# Patient Record
Sex: MALE | URBAN - METROPOLITAN AREA
[De-identification: names, ages, dates, MRNs, and addresses within clinical notes are randomized per-mention and may not be internally consistent; named-entity substitution may affect disease eponyms.]

---

## 2024-03-18 ENCOUNTER — LAB (OUTPATIENT)
Dept: URBAN - METROPOLITAN AREA CLINIC 109 | Facility: CLINIC | Age: 79
End: 2024-03-18

## 2024-03-18 ENCOUNTER — OV NP (OUTPATIENT)
Dept: URBAN - METROPOLITAN AREA CLINIC 109 | Facility: CLINIC | Age: 79
End: 2024-03-18
Payer: MEDICARE

## 2024-03-18 VITALS
TEMPERATURE: 97.2 F | DIASTOLIC BLOOD PRESSURE: 87 MMHG | SYSTOLIC BLOOD PRESSURE: 149 MMHG | HEIGHT: 63 IN | BODY MASS INDEX: 22.5 KG/M2 | HEART RATE: 74 BPM | WEIGHT: 127 LBS

## 2024-03-18 DIAGNOSIS — K62.5 RECTAL BLEEDING: ICD-10-CM

## 2024-03-18 DIAGNOSIS — K59.09 CHRONIC CONSTIPATION: ICD-10-CM

## 2024-03-18 DIAGNOSIS — R63.4 UNINTENTIONAL WEIGHT LOSS: ICD-10-CM

## 2024-03-18 PROCEDURE — 99204 OFFICE O/P NEW MOD 45 MIN: CPT

## 2024-03-18 RX ORDER — POLYETHYLENE GLYCOL 3350, SODIUM SULFATE ANHYDROUS, SODIUM BICARBONATE, SODIUM CHLORIDE, POTASSIUM CHLORIDE 236; 22.74; 6.74; 5.86; 2.97 G/4L; G/4L; G/4L; G/4L; G/4L
4000 ML POWDER, FOR SOLUTION ORAL ONCE
Qty: 1 | Refills: 0 | OUTPATIENT
Start: 2024-03-18 | End: 2024-03-19

## 2024-03-18 NOTE — HPI-TODAY'S VISIT:
Patient presents today for evaluation of rectal bleeding. Reports BRBPR on toilet paper for past 3 months. No blood in stool. Denies history of similar symptoms. Endorses hemorrhoids. No rectal pain/itching or painful BMs. Has BM daily, but if harder consistency than usual takes miralax or senna. No recent change in BMs or diarrhea. Reports unintentional weight loss of 27 lbs over last two years. Denies UGI sxs or abd pain. No NSAIDs or blood thinners. Last colonoscopy ~10 yrs ago, unsure where. Denies hx polyps. No FHX colon cancer.

## 2024-04-15 ENCOUNTER — COLON (OUTPATIENT)
Dept: URBAN - METROPOLITAN AREA SURGERY CENTER 23 | Facility: SURGERY CENTER | Age: 79
End: 2024-04-15

## 2024-05-07 ENCOUNTER — OUT OF OFFICE VISIT (OUTPATIENT)
Dept: URBAN - METROPOLITAN AREA SURGERY CENTER 23 | Facility: SURGERY CENTER | Age: 79
End: 2024-05-07
Payer: MEDICARE

## 2024-05-07 ENCOUNTER — TELEPHONE ENCOUNTER (OUTPATIENT)
Dept: URBAN - METROPOLITAN AREA CLINIC 118 | Facility: CLINIC | Age: 79
End: 2024-05-07

## 2024-05-07 ENCOUNTER — CLAIMS CREATED FROM THE CLAIM WINDOW (OUTPATIENT)
Dept: URBAN - METROPOLITAN AREA CLINIC 4 | Facility: CLINIC | Age: 79
End: 2024-05-07
Payer: MEDICARE

## 2024-05-07 DIAGNOSIS — K57.30 ACQUIRED DIVERTICULOSIS OF COLON: ICD-10-CM

## 2024-05-07 DIAGNOSIS — K62.5 ANAL BLEEDING: ICD-10-CM

## 2024-05-07 DIAGNOSIS — D12.2 BENIGN NEOPLASM OF ASCENDING COLON: ICD-10-CM

## 2024-05-07 DIAGNOSIS — D12.2 ADENOMA OF ASCENDING COLON: ICD-10-CM

## 2024-05-07 DIAGNOSIS — K64.2 THIRD DEGREE HEMORRHOIDS: ICD-10-CM

## 2024-05-07 DIAGNOSIS — K62.3 COMPLETE RECTAL PROLAPSE: ICD-10-CM

## 2024-05-07 DIAGNOSIS — K64.8 EXTERNAL HEMORRHOIDS: ICD-10-CM

## 2024-05-07 DIAGNOSIS — I10 ACCELERATED ESSENTIAL HYPERTENSION: ICD-10-CM

## 2024-05-07 PROCEDURE — 45385 COLONOSCOPY W/LESION REMOVAL: CPT | Performed by: INTERNAL MEDICINE

## 2024-05-07 PROCEDURE — G8907 PT DOC NO EVENTS ON DISCHARG: HCPCS | Performed by: INTERNAL MEDICINE

## 2024-05-07 PROCEDURE — 88305 TISSUE EXAM BY PATHOLOGIST: CPT | Performed by: STUDENT IN AN ORGANIZED HEALTH CARE EDUCATION/TRAINING PROGRAM

## 2024-05-07 PROCEDURE — 46221 LIGATION OF HEMORRHOID(S): CPT | Performed by: INTERNAL MEDICINE

## 2024-05-07 PROCEDURE — 00811 ANES LWR INTST NDSC NOS: CPT | Performed by: NURSE ANESTHETIST, CERTIFIED REGISTERED

## 2024-05-20 ENCOUNTER — OFFICE VISIT (OUTPATIENT)
Dept: URBAN - METROPOLITAN AREA CLINIC 109 | Facility: CLINIC | Age: 79
End: 2024-05-20
Payer: MEDICARE

## 2024-05-20 ENCOUNTER — TELEPHONE ENCOUNTER (OUTPATIENT)
Dept: URBAN - METROPOLITAN AREA CLINIC 109 | Facility: CLINIC | Age: 79
End: 2024-05-20

## 2024-05-20 ENCOUNTER — DASHBOARD ENCOUNTERS (OUTPATIENT)
Age: 79
End: 2024-05-20

## 2024-05-20 VITALS
WEIGHT: 130.5 LBS | HEART RATE: 71 BPM | DIASTOLIC BLOOD PRESSURE: 98 MMHG | TEMPERATURE: 97.3 F | BODY MASS INDEX: 23.12 KG/M2 | SYSTOLIC BLOOD PRESSURE: 167 MMHG | HEIGHT: 63 IN

## 2024-05-20 DIAGNOSIS — K62.5 RECTAL BLEEDING: ICD-10-CM

## 2024-05-20 DIAGNOSIS — K59.09 CHRONIC CONSTIPATION: ICD-10-CM

## 2024-05-20 PROCEDURE — 99213 OFFICE O/P EST LOW 20 MIN: CPT

## 2024-06-10 ENCOUNTER — TELEPHONE ENCOUNTER (OUTPATIENT)
Dept: URBAN - METROPOLITAN AREA CLINIC 109 | Facility: CLINIC | Age: 79
End: 2024-06-10